# Patient Record
Sex: FEMALE | Race: WHITE | NOT HISPANIC OR LATINO | Employment: OTHER | URBAN - METROPOLITAN AREA
[De-identification: names, ages, dates, MRNs, and addresses within clinical notes are randomized per-mention and may not be internally consistent; named-entity substitution may affect disease eponyms.]

---

## 2023-07-26 ENCOUNTER — ANCILLARY PROCEDURE (OUTPATIENT)
Dept: GENERAL RADIOLOGY | Facility: OTHER | Age: 58
End: 2023-07-26
Attending: SPECIALIST
Payer: MEDICARE

## 2023-07-26 ENCOUNTER — OFFICE VISIT (OUTPATIENT)
Dept: ORTHOPEDICS | Facility: OTHER | Age: 58
End: 2023-07-26
Attending: SPECIALIST
Payer: MEDICARE

## 2023-07-26 VITALS
HEIGHT: 64 IN | OXYGEN SATURATION: 94 % | WEIGHT: 250 LBS | DIASTOLIC BLOOD PRESSURE: 68 MMHG | HEART RATE: 107 BPM | RESPIRATION RATE: 20 BRPM | BODY MASS INDEX: 42.68 KG/M2 | SYSTOLIC BLOOD PRESSURE: 112 MMHG

## 2023-07-26 DIAGNOSIS — M25.561 RIGHT KNEE PAIN, UNSPECIFIED CHRONICITY: Primary | ICD-10-CM

## 2023-07-26 DIAGNOSIS — M25.561 RIGHT KNEE PAIN, UNSPECIFIED CHRONICITY: ICD-10-CM

## 2023-07-26 PROBLEM — H72.92 PERFORATION OF LEFT TYMPANIC MEMBRANE: Status: ACTIVE | Noted: 2023-03-07

## 2023-07-26 PROBLEM — L57.0 ACTINIC KERATOSIS: Status: ACTIVE | Noted: 2020-01-10

## 2023-07-26 PROBLEM — H52.203 MYOPIA OF BOTH EYES WITH ASTIGMATISM AND PRESBYOPIA: Status: ACTIVE | Noted: 2021-04-13

## 2023-07-26 PROBLEM — H52.4 MYOPIA OF BOTH EYES WITH ASTIGMATISM AND PRESBYOPIA: Status: ACTIVE | Noted: 2021-04-13

## 2023-07-26 PROBLEM — M15.0 PRIMARY OSTEOARTHRITIS INVOLVING MULTIPLE JOINTS: Status: ACTIVE | Noted: 2021-12-03

## 2023-07-26 PROBLEM — M79.7 FIBROMYALGIA: Status: ACTIVE | Noted: 2021-12-03

## 2023-07-26 PROBLEM — L28.0 LICHEN SIMPLEX, CHRONIC: Status: ACTIVE | Noted: 2020-01-29

## 2023-07-26 PROBLEM — H66.3X2 CHRONIC SUPPURATIVE OTITIS MEDIA OF LEFT EAR: Status: ACTIVE | Noted: 2022-08-24

## 2023-07-26 PROBLEM — E11.9 DIABETES MELLITUS TYPE 2 WITHOUT RETINOPATHY (H): Status: ACTIVE | Noted: 2021-04-30

## 2023-07-26 PROBLEM — H25.813 COMBINED FORMS OF AGE-RELATED CATARACT OF BOTH EYES: Status: ACTIVE | Noted: 2021-04-30

## 2023-07-26 PROBLEM — H91.90 HEARING LOSS, UNSPECIFIED HEARING LOSS TYPE, UNSPECIFIED LATERALITY: Status: ACTIVE | Noted: 2023-03-07

## 2023-07-26 PROBLEM — L73.2 HIDRADENITIS SUPPURATIVA: Status: ACTIVE | Noted: 2021-11-09

## 2023-07-26 PROBLEM — H52.13 MYOPIA OF BOTH EYES WITH ASTIGMATISM AND PRESBYOPIA: Status: ACTIVE | Noted: 2021-04-13

## 2023-07-26 PROBLEM — G47.33 OSA ON CPAP: Status: ACTIVE | Noted: 2022-12-07

## 2023-07-26 PROCEDURE — 99203 OFFICE O/P NEW LOW 30 MIN: CPT | Performed by: SPECIALIST

## 2023-07-26 PROCEDURE — G0463 HOSPITAL OUTPT CLINIC VISIT: HCPCS

## 2023-07-26 PROCEDURE — 73562 X-RAY EXAM OF KNEE 3: CPT | Mod: TC,RT,FY

## 2023-07-26 RX ORDER — GABAPENTIN 300 MG/1
1 CAPSULE ORAL 2 TIMES DAILY
COMMUNITY
Start: 2023-06-26

## 2023-07-26 RX ORDER — METRONIDAZOLE 7.5 MG/G
GEL TOPICAL 2 TIMES DAILY
COMMUNITY
Start: 2022-10-03

## 2023-07-26 RX ORDER — IXEKIZUMAB 80 MG/ML
80 INJECTION, SOLUTION SUBCUTANEOUS
COMMUNITY
Start: 2023-04-21

## 2023-07-26 RX ORDER — PREDNISONE 5 MG/1
5 TABLET ORAL
COMMUNITY
Start: 2023-06-21

## 2023-07-26 RX ORDER — METFORMIN HCL 500 MG
1 TABLET, EXTENDED RELEASE 24 HR ORAL 2 TIMES DAILY WITH MEALS
COMMUNITY
Start: 2022-10-31

## 2023-07-26 RX ORDER — DULOXETIN HYDROCHLORIDE 60 MG/1
1 CAPSULE, DELAYED RELEASE ORAL DAILY
COMMUNITY
Start: 2023-06-20

## 2023-07-26 RX ORDER — ATORVASTATIN CALCIUM 20 MG/1
1 TABLET, FILM COATED ORAL AT BEDTIME
COMMUNITY
Start: 2023-01-23

## 2023-07-26 RX ORDER — OFLOXACIN 3 MG/ML
7 SOLUTION AURICULAR (OTIC) 2 TIMES DAILY
COMMUNITY
Start: 2023-06-21

## 2023-07-26 RX ORDER — CLINDAMYCIN PHOSPHATE 10 MG/G
GEL TOPICAL
COMMUNITY
Start: 2023-05-17

## 2023-07-26 RX ORDER — LEFLUNOMIDE 20 MG/1
1 TABLET ORAL DAILY
COMMUNITY
Start: 2023-06-21

## 2023-07-26 RX ORDER — HYDROXYZINE PAMOATE 25 MG/1
CAPSULE ORAL 3 TIMES DAILY PRN
COMMUNITY
Start: 2023-06-20

## 2023-07-26 RX ORDER — MELOXICAM 15 MG/1
1 TABLET ORAL DAILY
COMMUNITY
Start: 2023-06-17

## 2023-07-26 RX ORDER — BUPROPION HYDROCHLORIDE 200 MG/1
200 TABLET, EXTENDED RELEASE ORAL 2 TIMES DAILY
COMMUNITY
Start: 2023-06-20

## 2023-07-26 RX ORDER — LOSARTAN POTASSIUM 50 MG/1
1 TABLET ORAL DAILY
COMMUNITY
Start: 2023-01-23

## 2023-07-26 ASSESSMENT — PAIN SCALES - GENERAL: PAINLEVEL: MODERATE PAIN (5)

## 2023-07-26 NOTE — PROGRESS NOTES
Service Date: 07/26/2023    The patient is a 58-year-old female seen today for evaluation of her right knee.  She has had right knee pain ongoing for a long time. The knee pain has progressively gotten worse over the last several months to a year.  She has complained of medial and anterior knee pain with activities, especially any walking, standing, stairs, etc.  She has previously had a left total knee arthroplasty and is doing well with this.  She is to the point where she has tried various over-the-counter medications and local pain relief modalities, etc.  She feels that her knee pain has gotten to the point where it is fairly limiting and she would like to consider a knee replacement.    PHYSICAL EXAMINATION:    GENERAL:  The patient is awake, alert and oriented, in no acute distress.  Overall, general mood, affect and appearance are normal.  MUSCULOSKELETAL:  On evaluation of the right knee, patient has tenderness to palpation over the medial side of her knee and patellofemoral joint.  She has some crepitation with flexion and extension.  Her range of motion is 0 to about 115 degrees of flexion.  The knee is stable on ligamentous stress testing.  She can do a straight leg raise against resistance without difficulty.  She is neurovascularly intact distally.    IMAGING:  X-rays taken today show severe arthritis of the right knee with medial joint space narrowing and patellofemoral involvement.    ASSESSMENT:  Severe primary osteoarthritis of the right knee.    PLAN:  We talked a lot about the options for treatment.  We talked about knee replacement surgery.  We discussed with replacement surgery, potential risks, complications, recovery time, etc.  The patient would like to proceed with a right total knee arthroplasty.  We talked about options for this as an outpatient at [facility], which is how she did her other knee.  We will contact her to schedule a right total knee arthroplasty at [facility] as an  outpatient.  She will need a preop medical evaluation prior to surgery.    Jt Cabrera MD        D: 2023   T: 2023   MT: [id number]    Name:     ALEXANDRIA ZEE  MRN:      [id number]        Account:      [id number]   :      1965           Service Date: 2023       Document: [id number]

## 2023-08-12 ENCOUNTER — HEALTH MAINTENANCE LETTER (OUTPATIENT)
Age: 58
End: 2023-08-12

## 2023-12-30 ENCOUNTER — HEALTH MAINTENANCE LETTER (OUTPATIENT)
Age: 58
End: 2023-12-30

## 2024-05-18 ENCOUNTER — HEALTH MAINTENANCE LETTER (OUTPATIENT)
Age: 59
End: 2024-05-18

## 2024-10-05 ENCOUNTER — HEALTH MAINTENANCE LETTER (OUTPATIENT)
Age: 59
End: 2024-10-05

## 2025-01-19 ENCOUNTER — HEALTH MAINTENANCE LETTER (OUTPATIENT)
Age: 60
End: 2025-01-19

## 2025-04-27 ENCOUNTER — HEALTH MAINTENANCE LETTER (OUTPATIENT)
Age: 60
End: 2025-04-27

## 2025-07-20 ENCOUNTER — HEALTH MAINTENANCE LETTER (OUTPATIENT)
Age: 60
End: 2025-07-20

## 2025-08-10 ENCOUNTER — HEALTH MAINTENANCE LETTER (OUTPATIENT)
Age: 60
End: 2025-08-10